# Patient Record
Sex: MALE | Race: WHITE | NOT HISPANIC OR LATINO | Employment: FULL TIME | URBAN - METROPOLITAN AREA
[De-identification: names, ages, dates, MRNs, and addresses within clinical notes are randomized per-mention and may not be internally consistent; named-entity substitution may affect disease eponyms.]

---

## 2017-01-03 ENCOUNTER — OFFICE VISIT - HEALTHEAST (OUTPATIENT)
Dept: INTERNAL MEDICINE | Facility: CLINIC | Age: 47
End: 2017-01-03

## 2017-01-03 DIAGNOSIS — Z00.00 HEALTH CARE MAINTENANCE: ICD-10-CM

## 2017-01-03 DIAGNOSIS — Z80.42 FAMILY HISTORY OF PROSTATE CANCER IN FATHER: ICD-10-CM

## 2017-01-03 DIAGNOSIS — E80.6 DISORDER OF BILIRUBIN EXCRETION: ICD-10-CM

## 2017-01-03 LAB
CHOLEST SERPL-MCNC: 182 MG/DL
FASTING STATUS PATIENT QL REPORTED: YES
HDLC SERPL-MCNC: 47 MG/DL
LDLC SERPL CALC-MCNC: 118 MG/DL
PSA SERPL-MCNC: 0.3 NG/ML (ref 0–2.5)
TRIGL SERPL-MCNC: 83 MG/DL

## 2017-01-03 ASSESSMENT — MIFFLIN-ST. JEOR: SCORE: 1731.68

## 2017-01-04 ENCOUNTER — COMMUNICATION - HEALTHEAST (OUTPATIENT)
Dept: INTERNAL MEDICINE | Facility: CLINIC | Age: 47
End: 2017-01-04

## 2018-04-06 ENCOUNTER — OFFICE VISIT - HEALTHEAST (OUTPATIENT)
Dept: INTERNAL MEDICINE | Facility: CLINIC | Age: 48
End: 2018-04-06

## 2018-04-06 DIAGNOSIS — Z12.11 SCREENING FOR COLORECTAL CANCER: ICD-10-CM

## 2018-04-06 DIAGNOSIS — Z80.42 FAMILY HISTORY OF PROSTATE CANCER IN FATHER: ICD-10-CM

## 2018-04-06 DIAGNOSIS — R00.2 PALPITATIONS: ICD-10-CM

## 2018-04-06 DIAGNOSIS — Z12.12 SCREENING FOR COLORECTAL CANCER: ICD-10-CM

## 2018-04-06 DIAGNOSIS — Z21 HIV ANTIBODY POSITIVE (H): ICD-10-CM

## 2018-04-06 DIAGNOSIS — Z00.00 HEALTH CARE MAINTENANCE: ICD-10-CM

## 2018-04-06 LAB
ALBUMIN SERPL-MCNC: 4.3 G/DL (ref 3.5–5)
ALBUMIN UR-MCNC: NEGATIVE MG/DL
ALP SERPL-CCNC: 84 U/L (ref 45–120)
ALT SERPL W P-5'-P-CCNC: 39 U/L (ref 0–45)
ANION GAP SERPL CALCULATED.3IONS-SCNC: 11 MMOL/L (ref 5–18)
APPEARANCE UR: CLEAR
AST SERPL W P-5'-P-CCNC: 37 U/L (ref 0–40)
BILIRUB SERPL-MCNC: 2 MG/DL (ref 0–1)
BILIRUB UR QL STRIP: NEGATIVE
BUN SERPL-MCNC: 26 MG/DL (ref 8–22)
CALCIUM SERPL-MCNC: 9.5 MG/DL (ref 8.5–10.5)
CHLORIDE BLD-SCNC: 108 MMOL/L (ref 98–107)
CHOLEST SERPL-MCNC: 168 MG/DL
CO2 SERPL-SCNC: 23 MMOL/L (ref 22–31)
COLOR UR AUTO: YELLOW
CREAT SERPL-MCNC: 1.16 MG/DL (ref 0.7–1.3)
ERYTHROCYTE [DISTWIDTH] IN BLOOD BY AUTOMATED COUNT: 12.3 % (ref 11–14.5)
FASTING STATUS PATIENT QL REPORTED: YES
GFR SERPL CREATININE-BSD FRML MDRD: >60 ML/MIN/1.73M2
GLUCOSE BLD-MCNC: 83 MG/DL (ref 70–125)
GLUCOSE UR STRIP-MCNC: NEGATIVE MG/DL
HCT VFR BLD AUTO: 48.1 % (ref 40–54)
HDLC SERPL-MCNC: 41 MG/DL
HGB BLD-MCNC: 16.5 G/DL (ref 14–18)
HGB UR QL STRIP: NEGATIVE
HIV 1+2 AB+HIV1 P24 AG SERPL QL IA: NEGATIVE
KETONES UR STRIP-MCNC: NEGATIVE MG/DL
LDLC SERPL CALC-MCNC: 108 MG/DL
LEUKOCYTE ESTERASE UR QL STRIP: NEGATIVE
MCH RBC QN AUTO: 30.5 PG (ref 27–34)
MCHC RBC AUTO-ENTMCNC: 34.3 G/DL (ref 32–36)
MCV RBC AUTO: 89 FL (ref 80–100)
NITRATE UR QL: NEGATIVE
PH UR STRIP: 7 [PH] (ref 5–8)
PLATELET # BLD AUTO: 177 THOU/UL (ref 140–440)
PMV BLD AUTO: 7.7 FL (ref 7–10)
POTASSIUM BLD-SCNC: 3.7 MMOL/L (ref 3.5–5)
PROT SERPL-MCNC: 7.3 G/DL (ref 6–8)
PSA SERPL-MCNC: 0.2 NG/ML (ref 0–3.5)
RBC # BLD AUTO: 5.41 MILL/UL (ref 4.4–6.2)
SODIUM SERPL-SCNC: 142 MMOL/L (ref 136–145)
SP GR UR STRIP: 1.02 (ref 1–1.03)
TRIGL SERPL-MCNC: 93 MG/DL
TSH SERPL DL<=0.005 MIU/L-ACNC: 0.96 UIU/ML (ref 0.3–5)
UROBILINOGEN UR STRIP-ACNC: NORMAL
WBC: 6.1 THOU/UL (ref 4–11)

## 2018-04-06 ASSESSMENT — MIFFLIN-ST. JEOR: SCORE: 1743.93

## 2018-04-09 LAB — 25(OH)D3 SERPL-MCNC: 18.6 NG/ML (ref 30–80)

## 2018-04-10 ENCOUNTER — AMBULATORY - HEALTHEAST (OUTPATIENT)
Dept: INTERNAL MEDICINE | Facility: CLINIC | Age: 48
End: 2018-04-10

## 2018-04-10 ENCOUNTER — HOSPITAL ENCOUNTER (OUTPATIENT)
Dept: CARDIOLOGY | Facility: CLINIC | Age: 48
Discharge: HOME | End: 2018-04-10
Attending: INTERNAL MEDICINE

## 2018-04-10 DIAGNOSIS — R17 ELEVATED BILIRUBIN: ICD-10-CM

## 2018-04-10 DIAGNOSIS — R00.2 PALPITATIONS: ICD-10-CM

## 2018-04-11 ENCOUNTER — COMMUNICATION - HEALTHEAST (OUTPATIENT)
Dept: INTERNAL MEDICINE | Facility: CLINIC | Age: 48
End: 2018-04-11

## 2018-05-14 ENCOUNTER — COMMUNICATION - HEALTHEAST (OUTPATIENT)
Dept: INTERNAL MEDICINE | Facility: CLINIC | Age: 48
End: 2018-05-14

## 2018-05-15 ENCOUNTER — COMMUNICATION - HEALTHEAST (OUTPATIENT)
Dept: FAMILY MEDICINE | Facility: CLINIC | Age: 48
End: 2018-05-15

## 2018-06-15 ENCOUNTER — RECORDS - HEALTHEAST (OUTPATIENT)
Dept: ADMINISTRATIVE | Facility: OTHER | Age: 48
End: 2018-06-15

## 2019-07-15 ENCOUNTER — OFFICE VISIT - HEALTHEAST (OUTPATIENT)
Dept: INTERNAL MEDICINE | Facility: CLINIC | Age: 49
End: 2019-07-15

## 2019-07-15 DIAGNOSIS — Z80.42 FAMILY HISTORY OF PROSTATE CANCER IN FATHER: ICD-10-CM

## 2019-07-15 DIAGNOSIS — E80.6 DISORDER OF BILIRUBIN EXCRETION: ICD-10-CM

## 2019-07-15 DIAGNOSIS — Z00.00 HEALTH CARE MAINTENANCE: ICD-10-CM

## 2019-07-15 DIAGNOSIS — C44.319 BASAL CELL CARCINOMA (BCC) OF SKIN OF OTHER PART OF FACE: ICD-10-CM

## 2019-07-15 DIAGNOSIS — Z00.00 ANNUAL PHYSICAL EXAM: ICD-10-CM

## 2019-07-15 LAB
ALBUMIN SERPL-MCNC: 4.3 G/DL (ref 3.5–5)
ALP SERPL-CCNC: 68 U/L (ref 45–120)
ALT SERPL W P-5'-P-CCNC: 37 U/L (ref 0–45)
ANION GAP SERPL CALCULATED.3IONS-SCNC: 12 MMOL/L (ref 5–18)
AST SERPL W P-5'-P-CCNC: 26 U/L (ref 0–40)
BILIRUB SERPL-MCNC: 1.7 MG/DL (ref 0–1)
BUN SERPL-MCNC: 30 MG/DL (ref 8–22)
CALCIUM SERPL-MCNC: 9.5 MG/DL (ref 8.5–10.5)
CHLORIDE BLD-SCNC: 107 MMOL/L (ref 98–107)
CHOLEST SERPL-MCNC: 165 MG/DL
CO2 SERPL-SCNC: 23 MMOL/L (ref 22–31)
CREAT SERPL-MCNC: 1.29 MG/DL (ref 0.7–1.3)
ERYTHROCYTE [DISTWIDTH] IN BLOOD BY AUTOMATED COUNT: 11.9 % (ref 11–14.5)
FASTING STATUS PATIENT QL REPORTED: YES
GFR SERPL CREATININE-BSD FRML MDRD: 59 ML/MIN/1.73M2
GLUCOSE BLD-MCNC: 87 MG/DL (ref 70–125)
HCT VFR BLD AUTO: 47.9 % (ref 40–54)
HDLC SERPL-MCNC: 44 MG/DL
HGB BLD-MCNC: 16.1 G/DL (ref 14–18)
LDLC SERPL CALC-MCNC: 102 MG/DL
MCH RBC QN AUTO: 30.1 PG (ref 27–34)
MCHC RBC AUTO-ENTMCNC: 33.7 G/DL (ref 32–36)
MCV RBC AUTO: 89 FL (ref 80–100)
PLATELET # BLD AUTO: 157 THOU/UL (ref 140–440)
PMV BLD AUTO: 7.2 FL (ref 7–10)
POTASSIUM BLD-SCNC: 3.7 MMOL/L (ref 3.5–5)
PROT SERPL-MCNC: 7.1 G/DL (ref 6–8)
PSA SERPL-MCNC: 0.2 NG/ML (ref 0–3.5)
RBC # BLD AUTO: 5.37 MILL/UL (ref 4.4–6.2)
SODIUM SERPL-SCNC: 142 MMOL/L (ref 136–145)
TRIGL SERPL-MCNC: 96 MG/DL
WBC: 6.2 THOU/UL (ref 4–11)

## 2019-07-15 ASSESSMENT — MIFFLIN-ST. JEOR: SCORE: 1728.05

## 2019-07-16 ENCOUNTER — COMMUNICATION - HEALTHEAST (OUTPATIENT)
Dept: INTERNAL MEDICINE | Facility: CLINIC | Age: 49
End: 2019-07-16

## 2019-07-16 LAB — 25(OH)D3 SERPL-MCNC: 27.5 NG/ML (ref 30–80)

## 2020-02-21 ENCOUNTER — RECORDS - HEALTHEAST (OUTPATIENT)
Dept: ADMINISTRATIVE | Facility: OTHER | Age: 50
End: 2020-02-21

## 2020-09-21 ENCOUNTER — RECORDS - HEALTHEAST (OUTPATIENT)
Dept: ADMINISTRATIVE | Facility: OTHER | Age: 50
End: 2020-09-21

## 2020-12-30 ENCOUNTER — COMMUNICATION - HEALTHEAST (OUTPATIENT)
Dept: TELEHEALTH | Facility: CLINIC | Age: 50
End: 2020-12-30

## 2020-12-30 ENCOUNTER — OFFICE VISIT - HEALTHEAST (OUTPATIENT)
Dept: FAMILY MEDICINE | Facility: CLINIC | Age: 50
End: 2020-12-30

## 2020-12-30 DIAGNOSIS — Z00.00 ANNUAL PHYSICAL EXAM: ICD-10-CM

## 2020-12-30 DIAGNOSIS — Z13.220 SCREENING FOR LIPOID DISORDERS: ICD-10-CM

## 2020-12-30 DIAGNOSIS — Z00.00 HEALTHCARE MAINTENANCE: ICD-10-CM

## 2020-12-30 DIAGNOSIS — E55.9 VITAMIN D DEFICIENCY: ICD-10-CM

## 2020-12-30 DIAGNOSIS — Z80.42 FAMILY HISTORY OF PROSTATE CANCER IN FATHER: ICD-10-CM

## 2020-12-30 DIAGNOSIS — E80.6 DISORDER OF BILIRUBIN EXCRETION: ICD-10-CM

## 2020-12-30 LAB
ALBUMIN SERPL-MCNC: 4.2 G/DL (ref 3.5–5)
ALP SERPL-CCNC: 73 U/L (ref 45–120)
ALT SERPL W P-5'-P-CCNC: 70 U/L (ref 0–45)
ANION GAP SERPL CALCULATED.3IONS-SCNC: 11 MMOL/L (ref 5–18)
AST SERPL W P-5'-P-CCNC: 40 U/L (ref 0–40)
BILIRUB SERPL-MCNC: 2.5 MG/DL (ref 0–1)
BUN SERPL-MCNC: 25 MG/DL (ref 8–22)
CALCIUM SERPL-MCNC: 8.9 MG/DL (ref 8.5–10.5)
CHLORIDE BLD-SCNC: 109 MMOL/L (ref 98–107)
CHOLEST SERPL-MCNC: 181 MG/DL
CO2 SERPL-SCNC: 22 MMOL/L (ref 22–31)
CREAT SERPL-MCNC: 1.29 MG/DL (ref 0.7–1.3)
ERYTHROCYTE [DISTWIDTH] IN BLOOD BY AUTOMATED COUNT: 11.7 % (ref 11–14.5)
FASTING STATUS PATIENT QL REPORTED: YES
GFR SERPL CREATININE-BSD FRML MDRD: 58 ML/MIN/1.73M2
GLUCOSE BLD-MCNC: 87 MG/DL (ref 70–125)
HCT VFR BLD AUTO: 51.2 % (ref 40–54)
HDLC SERPL-MCNC: 48 MG/DL
HGB BLD-MCNC: 17.1 G/DL (ref 14–18)
LDLC SERPL CALC-MCNC: 116 MG/DL
MCH RBC QN AUTO: 30.7 PG (ref 27–34)
MCHC RBC AUTO-ENTMCNC: 33.4 G/DL (ref 32–36)
MCV RBC AUTO: 92 FL (ref 80–100)
PLATELET # BLD AUTO: 176 THOU/UL (ref 140–440)
PMV BLD AUTO: 7.7 FL (ref 7–10)
POTASSIUM BLD-SCNC: 3.7 MMOL/L (ref 3.5–5)
PROT SERPL-MCNC: 7 G/DL (ref 6–8)
PSA SERPL-MCNC: 0.3 NG/ML (ref 0–3.5)
RBC # BLD AUTO: 5.56 MILL/UL (ref 4.4–6.2)
SODIUM SERPL-SCNC: 142 MMOL/L (ref 136–145)
TRIGL SERPL-MCNC: 85 MG/DL
WBC: 5.9 THOU/UL (ref 4–11)

## 2020-12-30 ASSESSMENT — MIFFLIN-ST. JEOR: SCORE: 1703.56

## 2020-12-31 ENCOUNTER — COMMUNICATION - HEALTHEAST (OUTPATIENT)
Dept: FAMILY MEDICINE | Facility: CLINIC | Age: 50
End: 2020-12-31

## 2020-12-31 LAB — 25(OH)D3 SERPL-MCNC: 22.2 NG/ML (ref 30–80)

## 2021-03-09 ENCOUNTER — RECORDS - HEALTHEAST (OUTPATIENT)
Dept: ADMINISTRATIVE | Facility: OTHER | Age: 51
End: 2021-03-09

## 2021-05-26 ENCOUNTER — RECORDS - HEALTHEAST (OUTPATIENT)
Dept: ADMINISTRATIVE | Facility: CLINIC | Age: 51
End: 2021-05-26

## 2021-05-30 VITALS — WEIGHT: 194.1 LBS | HEIGHT: 70 IN | BODY MASS INDEX: 27.79 KG/M2

## 2021-05-30 NOTE — PATIENT INSTRUCTIONS - HE
Recheck liver, kidneys, electrolytes today.    Recheck blood counts.    No immunizations today.    No changes to any medications.    Blood pressure and other vital signs look good today.    Recheck PSA.    Prostate felt normal today.    Dentist every 6 months.  Vision every 2 years.

## 2021-05-30 NOTE — PROGRESS NOTES
Assessment:   1. Annual physical exam  Generally healthy 49-year-old  male.    2. Health care maintenance  - [facility](CBC w/o Differential)  - Comprehensive Metabolic Panel  - Lipid Profile  - Vitamin D, Total (25-Hydroxy)  - PSA, Annual Screen (Prostatic-Specific Antigen)    3. Family history of prostate cancer in father  - PSA, Annual Screen (Prostatic-Specific Antigen)    4. Gilbert's Syndrome  Asymptomatic.  He is never had issues with jaundice    5. Basal cell carcinoma (BCC) of skin of other part of face  Sees the dermatologist once per year         Plan:     Patient Instructions   Recheck liver, kidneys, electrolytes today.    Recheck blood counts.    No immunizations today.    No changes to any medications.    Blood pressure and other vital signs look good today.    Recheck PSA.    Prostate felt normal today.    Dentist every 6 months.  Vision every 2 years.      Subjective:     Here for physical exam. Chart reviewed     Goes to the dentist every 6 months.  Vision is checked every 1 to 2 years.  Wears a seatbelt.  Happily  with 3 children.  He does have some baseline anxiety but says this is well can occasion.  He has no recent palpitations.  No problems with urination.      Past Medical History:   Diagnosis Date     Gilbert's syndrome      Past Surgical History:   Procedure Laterality Date     [place] APPENDECTOMY      Description: Appendectomy;  Recorded: 07/31/2008;     Patient has no known allergies.  Family History   Problem Relation Age of Onset     Prostate cancer Father      Hypertension Father      Hypertension Mother      Breast cancer Mother      Hyperlipidemia Mother      Social History     Socioeconomic History     Marital status:      Spouse name: Not on file     Number of children: Not on file     Years of education: Not on file     Highest education level: Not on file   Occupational History     Not on file   Social Needs     Financial resource strain: Not on file     Food  "insecurity:     Worry: Not on file     Inability: Not on file     Transportation needs:     Medical: Not on file     Non-medical: Not on file   Tobacco Use     Smoking status: Former Smoker   Substance and Sexual Activity     Alcohol use: Yes     Alcohol/week: 1.0 oz     Types: 2 Standard drinks or equivalent per week     Drug use: No     Sexual activity: Yes     Partners: Female     Birth control/protection: None   Lifestyle     Physical activity:     Days per week: Not on file     Minutes per session: Not on file     Stress: Not on file   Relationships     Social connections:     Talks on phone: Not on file     Gets together: Not on file     Attends Yazidi service: Not on file     Active member of club or organization: Not on file     Attends meetings of clubs or organizations: Not on file     Relationship status: Not on file     Intimate partner violence:     Fear of current or ex partner: Not on file     Emotionally abused: Not on file     Physically abused: Not on file     Forced sexual activity: Not on file   Other Topics Concern     Not on file   Social History Narrative     Not on file         Review of Systems  Please see form B           Objective:     Vitals:    07/15/19 0752   BP: 110/72   Pulse: 64   Weight: 193 lb 4.8 oz (87.7 kg)   Height: 5' 10\" (1.778 m)       Physical  General Appearance: Alert, cooperative, no distress, appears stated age  Head: Normocephalic, without obvious abnormality, atraumatic  Eyes: PERRL, fundi not observed, EOM's intact  Ears: Normal TM's and external ear canals bilateral  Nose: No abnormalities noted  Mouth and Throat: Normal appearance   Neck: Supple, symmetrical, trachea midline, no adenopathy or thyromegally,  no tenderness/mass/nodules; no carotid bruit or JVD  Back: no CVA tenderness or spinous process pain  Lungs: Clear to auscultation bilaterally, good air movent  Heart: Regular rate and rhythm, S1 and S2 normal, no murmur, rub, or gallop,  Abdomen: Soft, " non-tender, no masses, no organomegaly  Genitourinary:.  No hernias   Rectal exam:    Musculoskeletal: No gross abnormalities    Extremities: Extremities normal, atraumatic, no cyanosis or edema, pulses 2/4 and symmetric  Skin:  no  worrisome lesions noted  Lymph nodes: Cervical, supraclavicular, groin, and axillary nodes normal  Neurologic: CN2-12 intact, normal sensation, DTRs 2/4 and symmetric.   Psychiatric:  normal mood and affect.      No current outpatient medications on file.     No current facility-administered medications for this visit.

## 2021-06-01 VITALS — WEIGHT: 196.8 LBS | HEIGHT: 70 IN | BODY MASS INDEX: 28.18 KG/M2

## 2021-06-03 VITALS — BODY MASS INDEX: 27.67 KG/M2 | WEIGHT: 193.3 LBS | HEIGHT: 70 IN

## 2021-06-05 VITALS
OXYGEN SATURATION: 100 % | HEIGHT: 70 IN | HEART RATE: 68 BPM | DIASTOLIC BLOOD PRESSURE: 88 MMHG | BODY MASS INDEX: 26.9 KG/M2 | SYSTOLIC BLOOD PRESSURE: 134 MMHG | WEIGHT: 187.9 LBS

## 2021-06-08 NOTE — PROGRESS NOTES
Assessment:     Healthy male exam.   All preventive exams are up-to-date.  Fasting labs will be done today.        This note has been dictated using voice recognition software. Any grammatical or context distortions are unintentional and inherent to the software      1. Health care maintenance  HM2(CBC w/o Differential)    Comprehensive Metabolic Panel    Urinalysis-UC if Indicated    Vitamin D, Total (25-Hydroxy)    Lipid Profile   2. Family history of prostate cancer in father  PSA (Prostatic-Specific Antigen), Annual Screen   3. Gilbert's Syndrome  Bilirubin, Direct               Plan:     Return in about 1 year (around 1/3/2018) for Annual physical.    Subjective:     Chief Complaint   Patient presents with     Annual Exam     Teja Cummins is a 46 y.o. male who presents for an annual exam. No acute complaints.    Healthy Habits:   Regular Exercise: Yes  Sunscreen Use: Yes  Healthy Diet: Yes  Dental Visits Regularly: Yes  Seat Belt: Yes  Immunization status: up to date and documented.    Social History     Social History     Marital status:      Spouse name: N/A     Number of children: N/A     Years of education: N/A     Occupational History     Not on file.     Social History Main Topics     Smoking status: Former Smoker     Smokeless tobacco: Not on file     Alcohol use 1.0 oz/week     2 Standard drinks or equivalent per week     Drug use: No     Sexual activity: Yes     Partners: Female     Birth control/ protection: None     Other Topics Concern     Not on file     Social History Narrative       Family History   Problem Relation Age of Onset     Prostate cancer Father      Hypertension Father      Hypertension Mother      Breast cancer Mother      Hyperlipidemia Mother        Health Maintenance   Topic Date Due     INFLUENZA VACCINE RULE BASED (1) 08/01/2016     TD 18+ HE  07/31/2018     ADVANCE DIRECTIVES DISCUSSED WITH PATIENT  10/30/2020     TDAP ADULT ONE TIME DOSE  Completed       Patient  "Active Problem List   Diagnosis     Basal Cell Carcinoma Of The Skin     Gilbert's Syndrome     Family history of prostate cancer in father       No current outpatient prescriptions on file prior to visit.     No current facility-administered medications on file prior to visit.        Review of Systems  A 12 point comprehensive review of systems was negative except as noted in HPI.         Objective:      Visit Vitals     /70     Pulse (!) 58     Ht 5' 10\" (1.778 m)     Wt 194 lb 1.6 oz (88 kg)     BMI 27.85 kg/m2     Body mass index is 27.85 kg/(m^2).  Physical Exam:  General Appearance: Alert, cooperative, no distress, appears stated age.  Head: Normocephalic, without obvious abnormality, atraumatic  Eyes: PERRL, conjunctiva/corneas clear, EOM's intact  Ears: Normal TM's and external ear canals, both ears  Nose: Nares normal, septum midline,mucosa normal, no drainage  Throat: Lips, mucosa, and tongue normal; teeth and gums normal  Neck: Supple, symmetrical, trachea midline, no adenopathy;  thyroid: not enlarged, symmetric, no tenderness/mass/nodules; no carotid bruit or JVD  Back: Symmetric, no curvature, ROM normal, no CVA tenderness.  Lungs: Clear to auscultation bilaterally, respirations unlabored.  Breasts: No breast masses, tenderness, asymmetry, or nipple discharge.  Heart: Regular rate and rhythm, S1 and S2 normal, no murmur, rub, or gallop.  Abdomen: Soft, non-tender, bowel sounds active all four quadrants,  no masses, no organomegaly.  Pelvic:normal male genitalia, no palpable scrotal masses, prostate exam normal.  Extremities: Extremities normal, atraumatic, no cyanosis or edema.  Skin: Skin color, texture, turgor normal, no rashes or lesions.  Lymph nodes: Cervical, supraclavicular, and axillary nodes normal.  Neurologic: No focal neurological findings.  "

## 2021-06-14 NOTE — PATIENT INSTRUCTIONS - HE
Blood pressure and other vital signs look good today.    Weight looks fine.    Check with your insurance company about the Shingrix vaccine.    Follow-up in 1 year, sooner if needed.

## 2021-06-14 NOTE — PROGRESS NOTES
Assessment:   1. Annual physical exam  Generally healthy 50-year-old male    2. Healthcare maintenance  Due for colonoscopy in 2028  We talked about the Shingrix vaccine today.    3. Vitamin D deficiency  Vitamin D was a bit low last year.  He is not on replacement.  We can recheck again this year  - Vitamin D, Total (25-Hydroxy)    4. Gilbert's Syndrome  This is been stable.  He is asymptomatic.  No issues with jaundice.  His liver labs were normal last year  - Comprehensive Metabolic Panel  - HM2(CBC w/o Differential)    5. Family history of prostate cancer in father  No issues with nocturia.  His father had his prostate removed.  His prostate is physically assessed every year and has been normal.  - PSA (Prostatic-Specific Antigen), Annual Screen    6. Screening for lipoid disorders  He exercises on a regular basis.  - Lipid Profile         Plan:     Patient Instructions   Blood pressure and other vital signs look good today.    Weight looks fine.    Check with your insurance company about the Shingrix vaccine.    Follow-up in 1 year, sooner if needed.      Subjective:     Here for physical exam. Chart reviewed     Regular visits to the dentist every 6 months.  His anxiety has been well controlled without medication or therapy.  Happily  with 3 children.  He likes to lift weights and train for a 5K every year.  Has alcohol about once per week.  Has never smoked.      Past Medical History:   Diagnosis Date     Gilbert's syndrome      Past Surgical History:   Procedure Laterality Date     [place] APPENDECTOMY      Description: Appendectomy;  Recorded: 07/31/2008;     Patient has no known allergies.  Family History   Problem Relation Age of Onset     Prostate cancer Father      Hypertension Father      Hypertension Mother      Breast cancer Mother      Hyperlipidemia Mother      Social History     Socioeconomic History     Marital status:      Spouse name: Not on file     Number of children: Not on file      "Years of education: Not on file     Highest education level: Not on file   Occupational History     Not on file   Social Needs     Financial resource strain: Not on file     Food insecurity     Worry: Not on file     Inability: Not on file     Transportation needs     Medical: Not on file     Non-medical: Not on file   Tobacco Use     Smoking status: Former Smoker     Smokeless tobacco: Never Used   Substance and Sexual Activity     Alcohol use: Yes     Alcohol/week: 1.7 standard drinks     Types: 2 Standard drinks or equivalent per week     Drug use: No     Sexual activity: Yes     Partners: Female     Birth control/protection: None   Lifestyle     Physical activity     Days per week: Not on file     Minutes per session: Not on file     Stress: Not on file   Relationships     Social connections     Talks on phone: Not on file     Gets together: Not on file     Attends Zoroastrian service: Not on file     Active member of club or organization: Not on file     Attends meetings of clubs or organizations: Not on file     Relationship status: Not on file     Intimate partner violence     Fear of current or ex partner: Not on file     Emotionally abused: Not on file     Physically abused: Not on file     Forced sexual activity: Not on file   Other Topics Concern     Not on file   Social History Narrative     Not on file         Review of Systems  Please see form B           Objective:     Vitals:    12/30/20 0657   BP: 134/88   Pulse: 68   SpO2: 100%   Weight: 187 lb 14.4 oz (85.2 kg)   Height: 5' 10\" (1.778 m)       Physical  General Appearance: Alert, cooperative, no distress, appears stated age  Head: Normocephalic, without obvious abnormality, atraumatic  Eyes: PERRL, fundi not observed, EOM's intact  Ears: Normal TM's and external ear canals bilateral  Nose: No abnormalities noted  Mouth and Throat: Normal appearance   Neck: Supple, symmetrical, trachea midline, no adenopathy or thyromegally,  no " tenderness/mass/nodules; no carotid bruit or JVD  Back: no CVA tenderness or spinous process pain  Lungs: Clear to auscultation bilaterally, good air movent  Heart: Regular rate and rhythm, S1 and S2 normal, no murmur, rub, or gallop,  Abdomen: Soft, non-tender, no masses, no organomegaly  Genitourinary:.  No hernias   Rectal exam: Normal prostate  Musculoskeletal: No gross abnormalities    Extremities: Extremities normal, atraumatic, no cyanosis or edema, pulses 2/4 and symmetric  Skin:  no  worrisome lesions noted  Lymph nodes: Cervical, supraclavicular, groin, and axillary nodes normal  Neurologic: CN2-12 intact, normal sensation, DTRs 2/4 and symmetric.   Psychiatric:  normal mood and affect.      No current outpatient medications on file.     No current facility-administered medications for this visit.

## 2021-06-17 NOTE — PROGRESS NOTES
Assessment:     Healthy male exam.   All preventive exams are up-to-date.  Fasting labs will be done today.  HIV test will be done to confirm previous positive test..  Palpitations - TSH, Holter monitor ordered.    This note has been dictated using voice recognition software. Any grammatical or context distortions are unintentional and inherent to the software      1. Family history of prostate cancer in father  PSA, Annual Screen (Prostatic-Specific Antigen)   2. Health care maintenance  HM2(CBC w/o Differential)    Comprehensive Metabolic Panel    Lipid Profile    Vitamin D, Total (25-Hydroxy)    Urinalysis   3. Screening for colorectal cancer  Ambulatory referral for Colonoscopy   4. Palpitations  Thyroid Stimulating Hormone (TSH)    NEREIDA Hook-Up   5. HIV antibody positive  HIV Antigen/Antibody Screening [place]       There are no Patient Instructions on file for this visit.          Plan:     Return in about 1 year (around 4/6/2019) for Annual physical.    Subjective:     Chief Complaint   Patient presents with     Annual Exam     colon-not completed. fasting labs     Teja Cummins is a 48 y.o. male who presents for an annual exam.   He is experiencing palpitations on/off for the last few years when under the emotional stress, or drinking alcohol or is dehydrated. He describes heart racing with no other symptoms, episodes coming on and off for a whole day sometimes.    He wanted to donate blood last summer, but the test there came back positive for HIV. Patient declines any risk exposure, use of IV drugs or blood transfusion. He is  and has 2 children.    Healthy Habits:   Regular Exercise: Yes  Sunscreen Use: Yes  Healthy Diet: Yes  Dental Visits Regularly: Yes  Seat Belt: Yes  Immunization status: up to date and documented.    Social History     Social History     Marital status:      Spouse name: N/A     Number of children: N/A     Years of education: N/A     Occupational History     Not  "on file.     Social History Main Topics     Smoking status: Former Smoker     Smokeless tobacco: Not on file     Alcohol use 1.0 oz/week     2 Standard drinks or equivalent per week     Drug use: No     Sexual activity: Yes     Partners: Female     Birth control/ protection: None     Other Topics Concern     Not on file     Social History Narrative       Family History   Problem Relation Age of Onset     Prostate cancer Father      Hypertension Father      Hypertension Mother      Breast cancer Mother      Hyperlipidemia Mother        Health Maintenance   Topic Date Due     INFLUENZA VACCINE RULE BASED (1) 08/01/2017     COLONOSCOPY  02/05/2018     TD 18+ HE  07/31/2018     ADVANCE DIRECTIVES DISCUSSED WITH PATIENT  10/30/2020     TDAP ADULT ONE TIME DOSE  Completed       Patient Active Problem List   Diagnosis     Basal Cell Carcinoma Of The Skin     Gilbert's Syndrome     Family history of prostate cancer in father       No current outpatient prescriptions on file prior to visit.     No current facility-administered medications on file prior to visit.        Review of Systems  A 12 point comprehensive review of systems was negative except as noted in HPI.         Objective:      /80  Pulse 64  Ht 5' 10\" (1.778 m)  Wt 196 lb 12.8 oz (89.3 kg)  BMI 28.24 kg/m2  Body mass index is 28.24 kg/(m^2).  Physical Exam:  General Appearance: Alert, cooperative, no distress, appears stated age.  Head: Normocephalic, without obvious abnormality, atraumatic  Eyes: PERRL, conjunctiva/corneas clear, EOM's intact  Ears: Normal TM's and external ear canals, both ears  Nose: Nares normal, septum midline,mucosa normal, no drainage  Throat: Lips, mucosa, and tongue normal; teeth and gums normal  Neck: Supple, symmetrical, trachea midline, no adenopathy;  thyroid: not enlarged, symmetric, no tenderness/mass/nodules; no carotid bruit or JVD  Back: Symmetric, no curvature, ROM normal, no CVA tenderness.  Lungs: Clear to " auscultation bilaterally, respirations unlabored.  Breasts: No breast masses, tenderness, asymmetry, or nipple discharge.  Heart: Regular rate and rhythm, S1 and S2 normal, no murmur, rub, or gallop.  Abdomen: Soft, non-tender, bowel sounds active all four quadrants,  no masses, no organomegaly.  Pelvic:normal male genitalia, no palpable scrotal masses, prostate exam normal.  Extremities: Extremities normal, atraumatic, no cyanosis or edema.  Skin: Skin color, texture, turgor normal, no rashes or lesions.  Lymph nodes: Cervical, supraclavicular, and axillary nodes normal.  Neurologic: No focal neurological findings.

## 2021-06-19 NOTE — LETTER
Letter by Jase Rae CNP at      Author: Jase Rae CNP Service: -- Author Type: --    Filed:  Encounter Date: 7/16/2019 Status: (Other)         Teja Cummins  [address] [facility] [address]             July 16, 2019         Dear Mr. Cummins,    Below are the results from your recent visit:    Resulted Orders   HM2(CBC w/o Differential)   Result Value Ref Range    WBC 6.2 4.0 - 11.0 thou/uL    RBC 5.37 4.40 - 6.20 mill/uL    Hemoglobin 16.1 14.0 - 18.0 g/dL    Hematocrit 47.9 40.0 - 54.0 %    MCV 89 80 - 100 fL    MCH 30.1 27.0 - 34.0 pg    MCHC 33.7 32.0 - 36.0 g/dL    RDW 11.9 11.0 - 14.5 %    Platelets 157 140 - 440 thou/uL    MPV 7.2 7.0 - 10.0 fL   Comprehensive Metabolic Panel   Result Value Ref Range    Sodium 142 136 - 145 mmol/L    Potassium 3.7 3.5 - 5.0 mmol/L    Chloride 107 98 - 107 mmol/L    CO2 23 22 - 31 mmol/L    Anion Gap, Calculation 12 5 - 18 mmol/L    Glucose 87 70 - 125 mg/dL    BUN 30 (H) 8 - 22 mg/dL    Creatinine 1.29 0.70 - 1.30 mg/dL    GFR MDRD Af Amer >60 >60 mL/min/1.73m2    GFR MDRD Non Af Amer 59 (L) >60 mL/min/1.73m2    Bilirubin, Total 1.7 (H) 0.0 - 1.0 mg/dL    Calcium 9.5 8.5 - 10.5 mg/dL    Protein, Total 7.1 6.0 - 8.0 g/dL    Albumin 4.3 3.5 - 5.0 g/dL    Alkaline Phosphatase 68 45 - 120 U/L    AST 26 0 - 40 U/L    ALT 37 0 - 45 U/L    Narrative    Fasting Glucose reference range is 70-99 mg/dL per  American Diabetes Association (ADA) guidelines.   Lipid Profile   Result Value Ref Range    Triglycerides 96 <=149 mg/dL    Cholesterol 165 <=199 mg/dL    LDL Calculated 102 <=129 mg/dL    HDL Cholesterol 44 >=40 mg/dL    Patient Fasting > 8hrs? Yes    Vitamin D, Total (25-Hydroxy)   Result Value Ref Range    Vitamin D, Total (25-Hydroxy) 27.5 (L) 30.0 - 80.0 ng/mL    Narrative    Deficiency <10.0 ng/mL  Insufficiency 10.0-29.9 ng/mL  Sufficiency 30.0-80.0 ng/mL  Toxicity (possible) >100.0 ng/mL   PSA, Annual Screen (Prostatic-Specific Antigen)   Result  Value Ref Range    PSA 0.2 0.0 - 3.5 ng/mL    Narrative    Method is Abbott Prostate-Specific Antigen (PSA)  Standard-WHO 1st International (90:10)       Labs look stable.    Vitamin D is borderline low.  I recommend that you take your vitamin D supplement every day, including in the summer.    Follow-up next year, sooner if needed.    Please call with questions or contact us using [employer].    Sincerely,        Electronically signed by Jase Rae CNP

## 2021-06-21 NOTE — LETTER
Letter by Jase Rae CNP at      Author: Jase Rae CNP Service: -- Author Type: --    Filed:  Encounter Date: 12/31/2020 Status: (Other)         Teja Cummins  [address] [facility] [address]           December 31, 2020         Dear Mr. Cummins,    Below are the results from your recent visit:    Resulted Orders   PSA (Prostatic-Specific Antigen), Annual Screen   Result Value Ref Range    PSA 0.3 0.0 - 3.5 ng/mL    Narrative    Method is Abbott Prostate-Specific Antigen (PSA)  Standard-WHO 1st International (90:10)   Lipid Profile   Result Value Ref Range    Triglycerides 85 <=149 mg/dL    Cholesterol 181 <=199 mg/dL    LDL Calculated 116 <=129 mg/dL    HDL Cholesterol 48 >=40 mg/dL    Patient Fasting > 8hrs? Yes    Comprehensive Metabolic Panel   Result Value Ref Range    Sodium 142 136 - 145 mmol/L    Potassium 3.7 3.5 - 5.0 mmol/L    Chloride 109 (H) 98 - 107 mmol/L    CO2 22 22 - 31 mmol/L    Anion Gap, Calculation 11 5 - 18 mmol/L    Glucose 87 70 - 125 mg/dL    BUN 25 (H) 8 - 22 mg/dL    Creatinine 1.29 0.70 - 1.30 mg/dL    GFR MDRD Af Amer >60 >60 mL/min/1.73m2    GFR MDRD Non Af Amer 58 (L) >60 mL/min/1.73m2    Bilirubin, Total 2.5 (H) 0.0 - 1.0 mg/dL    Calcium 8.9 8.5 - 10.5 mg/dL    Protein, Total 7.0 6.0 - 8.0 g/dL    Albumin 4.2 3.5 - 5.0 g/dL    Alkaline Phosphatase 73 45 - 120 U/L    AST 40 0 - 40 U/L    ALT 70 (H) 0 - 45 U/L    Narrative    Fasting Glucose reference range is 70-99 mg/dL per  American Diabetes Association (ADA) guidelines.   HM2(CBC w/o Differential)   Result Value Ref Range    WBC 5.9 4.0 - 11.0 thou/uL    RBC 5.56 4.40 - 6.20 mill/uL    Hemoglobin 17.1 14.0 - 18.0 g/dL    Hematocrit 51.2 40.0 - 54.0 %    MCV 92 80 - 100 fL    MCH 30.7 27.0 - 34.0 pg    MCHC 33.4 32.0 - 36.0 g/dL    RDW 11.7 11.0 - 14.5 %    Platelets 176 140 - 440 thou/uL    MPV 7.7 7.0 - 10.0 fL   Vitamin D, Total (25-Hydroxy)   Result Value Ref Range    Vitamin D, Total (25-Hydroxy) 22.2  (L) 30.0 - 80.0 ng/mL    Narrative    Deficiency <10.0 ng/mL  Insufficiency 10.0-29.9 ng/mL  Sufficiency 30.0-80.0 ng/mL  Toxicity (possible) >100.0 ng/mL        ALT is very mildly elevated this year; likely related to your underlying Gilbert's syndrome.  We may want to recheck this in a few months to ensure that it is stable.    Vitamin D is still low.  I recommend 3 to 5000 units daily.  This is available over-the-counter.    Cholesterol labs again look good this year.    Blood counts are normal.    PSA is normal.    Please call with questions or contact us using [employer].    Sincerely,        Electronically signed by Jase Rae CNP

## 2021-06-27 ENCOUNTER — HEALTH MAINTENANCE LETTER (OUTPATIENT)
Age: 51
End: 2021-06-27

## 2021-08-03 ENCOUNTER — OFFICE VISIT (OUTPATIENT)
Dept: FAMILY MEDICINE | Facility: CLINIC | Age: 51
End: 2021-08-03
Payer: COMMERCIAL

## 2021-08-03 VITALS
BODY MASS INDEX: 26.58 KG/M2 | DIASTOLIC BLOOD PRESSURE: 84 MMHG | HEIGHT: 70 IN | WEIGHT: 185.7 LBS | HEART RATE: 68 BPM | SYSTOLIC BLOOD PRESSURE: 128 MMHG | OXYGEN SATURATION: 98 %

## 2021-08-03 DIAGNOSIS — L72.3 SEBACEOUS CYST: Primary | ICD-10-CM

## 2021-08-03 PROCEDURE — 99213 OFFICE O/P EST LOW 20 MIN: CPT | Performed by: NURSE PRACTITIONER

## 2021-08-03 ASSESSMENT — MIFFLIN-ST. JEOR: SCORE: 1693.58

## 2021-08-03 NOTE — PROGRESS NOTES
"    Assessment & Plan     Sebaceous cyst  Talked about conservative measures. See patient instructions below for plan of care    Patient Instructions   I suspect that the small lesion on your calf is a sebaceous cyst.    I do not think it is a lipoma. I do not think that it is a tear in your fascia, resulting in a small muscular hernia.    I do not think you have a blood clot in your calf.    I do not think we need to incise and drain the cyst today; it does not appear to need antibiotics at this point.    Lets do aggressive warm compresses four times daily for the next week. Use a hot wet rag.    If your symptoms do not start to get better within the next week, send us a [employer] message and we can try oral antibiotics.      17 minutes spent on the date of the encounter doing chart review, history and exam, documentation and further activities per the note       BMI:   Estimated body mass index is 26.65 kg/m  as calculated from the following:    Height as of this encounter: 1.778 m (5' 10\").    Weight as of this encounter: 84.2 kg (185 lb 11.2 oz).       See Patient Instructions    Return in about 4 months (around 12/3/2021) for Routine preventive.    Jase Rae, [facility]    Ester Lezama is a 51 year old who presents for the following health issues   HPI   Patient comes to the clinic today with some tenderness associated to a small lesion on his right calf muscle.    He first started having some lower right calf muscle pain about 2 to 3 weeks ago. He was running and noticed some tenderness in that area. Seemed to have discomfort when overusing his right leg.    The tenderness in the calf muscle has subsided mostly, but he continues to have some soreness near a very specific spot in his calf, where he has noticed a small \"lump.\"    He does not recall getting stung by an insect or having any localized trauma to that area.    He has not noticed any redness. No " "streaking. No fevers.      Review of Systems   Constitutional, HEENT, cardiovascular, pulmonary, gi and gu systems are negative, except as otherwise noted.      Objective    /84 (BP Location: Right arm, Patient Position: Sitting, Cuff Size: Adult Regular)   Pulse 68   Ht 1.778 m (5' 10\")   Wt 84.2 kg (185 lb 11.2 oz)   SpO2 98%   BMI 26.65 kg/m    Body mass index is 26.65 kg/m .  Physical Exam   His right calf is 44.5 cm in circumference. His left calf is 43.5 centimeters in circumference.    Homans' sign negative bilaterally.    There appears to be a \"lump\" in the patient's mid left calf, medially. It is lobulated, tender, nonmobile, firm, approximately 2.5 cm in diameter. Unclear if there is a central \"head.\"        "

## 2021-08-03 NOTE — PATIENT INSTRUCTIONS
I suspect that the small lesion on your calf is a sebaceous cyst.    I do not think it is a lipoma. I do not think that it is a tear in your fascia, resulting in a small muscular hernia.    I do not think you have a blood clot in your calf.    I do not think we need to incise and drain the cyst today; it does not appear to need antibiotics at this point.    Lets do aggressive warm compresses four times daily for the next week. Use a hot wet rag.    If your symptoms do not start to get better within the next week, send us a MyChart message and we can try oral antibiotics.

## 2021-09-15 ENCOUNTER — TRANSFERRED RECORDS (OUTPATIENT)
Dept: HEALTH INFORMATION MANAGEMENT | Facility: CLINIC | Age: 51
End: 2021-09-15

## 2021-10-17 ENCOUNTER — HEALTH MAINTENANCE LETTER (OUTPATIENT)
Age: 51
End: 2021-10-17

## 2022-02-06 ENCOUNTER — HEALTH MAINTENANCE LETTER (OUTPATIENT)
Age: 52
End: 2022-02-06

## 2022-03-10 ENCOUNTER — OFFICE VISIT (OUTPATIENT)
Dept: FAMILY MEDICINE | Facility: CLINIC | Age: 52
End: 2022-03-10
Payer: COMMERCIAL

## 2022-03-10 VITALS
BODY MASS INDEX: 27.3 KG/M2 | HEIGHT: 71 IN | WEIGHT: 195 LBS | HEART RATE: 57 BPM | OXYGEN SATURATION: 100 % | SYSTOLIC BLOOD PRESSURE: 112 MMHG | DIASTOLIC BLOOD PRESSURE: 84 MMHG

## 2022-03-10 DIAGNOSIS — Z00.00 HEALTHCARE MAINTENANCE: ICD-10-CM

## 2022-03-10 DIAGNOSIS — Z13.220 SCREENING FOR LIPOID DISORDERS: ICD-10-CM

## 2022-03-10 DIAGNOSIS — E80.6 DISORDER OF BILIRUBIN EXCRETION: ICD-10-CM

## 2022-03-10 DIAGNOSIS — E55.9 VITAMIN D DEFICIENCY: ICD-10-CM

## 2022-03-10 DIAGNOSIS — Z12.5 SPECIAL SCREENING FOR MALIGNANT NEOPLASM OF PROSTATE: Primary | ICD-10-CM

## 2022-03-10 DIAGNOSIS — Z00.00 ANNUAL PHYSICAL EXAM: ICD-10-CM

## 2022-03-10 LAB
ALBUMIN SERPL-MCNC: 4.2 G/DL (ref 3.5–5)
ALP SERPL-CCNC: 70 U/L (ref 45–120)
ALT SERPL W P-5'-P-CCNC: 44 U/L (ref 0–45)
ANION GAP SERPL CALCULATED.3IONS-SCNC: 12 MMOL/L (ref 5–18)
AST SERPL W P-5'-P-CCNC: 29 U/L (ref 0–40)
BILIRUB SERPL-MCNC: 2 MG/DL (ref 0–1)
BUN SERPL-MCNC: 31 MG/DL (ref 8–22)
CALCIUM SERPL-MCNC: 9.3 MG/DL (ref 8.5–10.5)
CHLORIDE BLD-SCNC: 109 MMOL/L (ref 98–107)
CHOLEST SERPL-MCNC: 149 MG/DL
CO2 SERPL-SCNC: 22 MMOL/L (ref 22–31)
CREAT SERPL-MCNC: 1.06 MG/DL (ref 0.7–1.3)
FASTING STATUS PATIENT QL REPORTED: YES
GFR SERPL CREATININE-BSD FRML MDRD: 83 ML/MIN/1.73M2
GLUCOSE BLD-MCNC: 83 MG/DL (ref 70–125)
HDLC SERPL-MCNC: 40 MG/DL
LDLC SERPL CALC-MCNC: 95 MG/DL
POTASSIUM BLD-SCNC: 3.8 MMOL/L (ref 3.5–5)
PROT SERPL-MCNC: 7 G/DL (ref 6–8)
PSA SERPL-MCNC: 0.17 UG/L (ref 0–3.5)
SODIUM SERPL-SCNC: 143 MMOL/L (ref 136–145)
TRIGL SERPL-MCNC: 70 MG/DL

## 2022-03-10 PROCEDURE — G0103 PSA SCREENING: HCPCS | Performed by: NURSE PRACTITIONER

## 2022-03-10 PROCEDURE — 80061 LIPID PANEL: CPT | Performed by: NURSE PRACTITIONER

## 2022-03-10 PROCEDURE — 82306 VITAMIN D 25 HYDROXY: CPT | Performed by: NURSE PRACTITIONER

## 2022-03-10 PROCEDURE — 36415 COLL VENOUS BLD VENIPUNCTURE: CPT | Performed by: NURSE PRACTITIONER

## 2022-03-10 PROCEDURE — 80053 COMPREHEN METABOLIC PANEL: CPT | Performed by: NURSE PRACTITIONER

## 2022-03-10 PROCEDURE — 99396 PREV VISIT EST AGE 40-64: CPT | Performed by: NURSE PRACTITIONER

## 2022-03-10 NOTE — PROGRESS NOTES
SUBJECTIVE:   CC: Teja Cummins is an 52 year old male who presents for preventative health visit.       Patient has been advised of split billing requirements and indicates understanding: No  Healthy Habits:     Getting at least 3 servings of Calcium per day:  Yes    Bi-annual eye exam:  Yes    Dental care twice a year:  NO    Sleep apnea or symptoms of sleep apnea:  None    Diet:  Regular (no restrictions)    Frequency of exercise:  4-5 days/week    Duration of exercise:  15-30 minutes    Taking medications regularly:  Yes    Medication side effects:  None    PHQ-2 Total Score: 0    Additional concerns today:  No    Today's PHQ-2 Score:   PHQ-2 ( 1999 Pfizer) 3/9/2022   Q1: Little interest or pleasure in doing things 0   Q2: Feeling down, depressed or hopeless 0   PHQ-2 Score 0   Q1: Little interest or pleasure in doing things Not at all   Q2: Feeling down, depressed or hopeless Not at all   PHQ-2 Score 0       Abuse: Current or Past(Physical, Sexual or Emotional)- No  Do you feel safe in your environment? Yes    Have you ever done Advance Care Planning? (For example, a Health Directive, POLST, or a discussion with a medical provider or your loved ones about your wishes): No, advance care planning information given to patient to review.  Patient declined advance care planning discussion at this time.    Social History     Tobacco Use     Smoking status: Former Smoker     Smokeless tobacco: Never Used   Substance Use Topics     Alcohol use: Yes     Alcohol/week: 1.7 standard drinks     If you drink alcohol do you typically have >3 drinks per day or >7 drinks per week? No    Alcohol Use 3/9/2022   Prescreen: >3 drinks/day or >7 drinks/week? No   No flowsheet data found.    Last PSA:   Prostate Specific Antigen Screen   Date Value Ref Range Status   12/30/2020 0.3 0.0 - 3.5 ng/mL Final       Reviewed orders with patient. Reviewed health maintenance and updated orders accordingly - Yes  Lab work is in  "process    Reviewed and updated as needed this visit by clinical staff                  Reviewed and updated as needed this visit by Provider                 Past Medical History:   Diagnosis Date     Gilbert's syndrome         Review of Systems  CONSTITUTIONAL: NEGATIVE for fever, chills, change in weight  INTEGUMENTARY/SKIN: NEGATIVE for worrisome rashes, moles or lesions  EYES: NEGATIVE for vision changes or irritation  ENT: NEGATIVE for ear, mouth and throat problems  RESP: NEGATIVE for significant cough or SOB  CV: NEGATIVE for chest pain, palpitations or peripheral edema  GI: NEGATIVE for nausea, abdominal pain, heartburn, or change in bowel habits   male: negative for dysuria, hematuria, decreased urinary stream, erectile dysfunction, urethral discharge  MUSCULOSKELETAL: NEGATIVE for significant arthralgias or myalgia  NEURO: NEGATIVE for weakness, dizziness or paresthesias  PSYCHIATRIC: NEGATIVE for changes in mood or affect    OBJECTIVE:   /84 (BP Location: Right arm, Patient Position: Sitting, Cuff Size: Adult Regular)   Pulse 57   Ht 1.791 m (5' 10.5\")   Wt 88.5 kg (195 lb)   SpO2 100%   BMI 27.58 kg/m      Physical Exam  GENERAL: healthy, alert and no distress  NECK: no adenopathy, no asymmetry, masses, or scars and thyroid normal to palpation  RESP: lungs clear to auscultation - no rales, rhonchi or wheezes  CV: regular rate and rhythm, normal S1 S2, no S3 or S4, no murmur, click or rub, no peripheral edema and peripheral pulses strong  ABDOMEN: soft, nontender, no hepatosplenomegaly, no masses and bowel sounds normal  MS: no gross musculoskeletal defects noted, no edema    Diagnostic Test Results:  Labs reviewed in Epic    ASSESSMENT/PLAN:     1. Annual physical exam  Generally healthy 52-year-old male.  His anxiety is well controlled without therapy or medication.    2. Healthcare maintenance  Colonoscopy due in 2028.  Check a PSA today.  He is up-to-date on immunizations but we did talk " "about Shingrix vaccine today    3. Special screening for malignant neoplasm of prostate  Family history of prostate cancer in his father  - PSA, screen; Future    4. Vitamin D deficiency  Has historically been low.  Now intermittent use of about 1000 IU daily  - Vitamin D deficiency screening; Future    5. Gilbert's Syndrome  Liver labs have historically been stable  - Comprehensive metabolic panel; Future    6. Screening for lipoid disorders  Cholesterol labs have been stable  - Lipid Profile (Chol, Trig, HDL, LDL calc); Future    Patient Instructions   Blood pressure and other vital signs are good today.    Weight is up about 10 pounds since August.    We will check a variety of labs today.  Results will be made available on [employer].    Check with insurance about Shingrix coverage.  You can always get this at [facility] and [facility] for reduced cost.    Follow-up in 1 year, sooner if needed        Patient has been advised of split billing requirements and indicates understanding: No    COUNSELING:   Reviewed preventive health counseling, as reflected in patient instructions       Regular exercise       Healthy diet/nutrition       Vision screening    Estimated body mass index is 26.65 kg/m  as calculated from the following:    Height as of 8/3/21: 1.778 m (5' 10\").    Weight as of 8/3/21: 84.2 kg (185 lb 11.2 oz).     Weight management plan: Discussed healthy diet and exercise guidelines    He reports that he has quit smoking. He has never used smokeless tobacco.      Counseling Resources:  ATP IV Guidelines  Pooled Cohorts Equation Calculator  FRAX Risk Assessment  ICSI Preventive Guidelines  Dietary Guidelines for Americans, 2010  USDA's MyPlate  ASA Prophylaxis  Lung CA Screening    Jase Rae, CNP  [facility]  "

## 2022-03-10 NOTE — PATIENT INSTRUCTIONS
Blood pressure and other vital signs are good today.    Weight is up about 10 pounds since August.    We will check a variety of labs today.  Results will be made available on [employer].    Check with insurance about Shingrix coverage.  You can always get this at [facility] and [facility] for reduced cost.    Follow-up in 1 year, sooner if needed

## 2022-03-11 LAB — DEPRECATED CALCIDIOL+CALCIFEROL SERPL-MC: 23 UG/L (ref 30–80)

## 2022-09-13 ENCOUNTER — TRANSFERRED RECORDS (OUTPATIENT)
Dept: HEALTH INFORMATION MANAGEMENT | Facility: CLINIC | Age: 52
End: 2022-09-13

## 2022-10-02 ENCOUNTER — HEALTH MAINTENANCE LETTER (OUTPATIENT)
Age: 52
End: 2022-10-02

## 2022-11-16 ENCOUNTER — E-VISIT (OUTPATIENT)
Dept: INTERNAL MEDICINE | Facility: CLINIC | Age: 52
End: 2022-11-16
Payer: COMMERCIAL

## 2022-11-16 DIAGNOSIS — N34.2 URETHRITIS: ICD-10-CM

## 2022-11-16 DIAGNOSIS — R30.0 DYSURIA: Primary | ICD-10-CM

## 2022-11-16 PROCEDURE — 99421 OL DIG E/M SVC 5-10 MIN: CPT | Performed by: NURSE PRACTITIONER

## 2022-11-16 NOTE — PATIENT INSTRUCTIONS
Dear Teja Cummins,     After reviewing your responses, I would like you to come in for a urine test to make sure we treat you correctly. This urine test is to evaluate you for a possible urinary tract infection, and should be scheduled for today or tomorrow. Schedule a Lab Only appointment here.     Lab appointments are not available at most locations on the weekends. If no Lab Only appointment is available, you should be seen in any of our convenient Walk-in or Urgent Care Centers, which can be found on our website here.     You will receive instructions with your results in [employer] once they are available.     If your symptoms worsen, you develop pain in your back or stomach, develop fevers, or are not improving in 5 days, please contact your primary care provider for an appointment or visit a Walk-in or Urgent Care Center to be seen.     Thanks again for choosing us as your health care partner,     Jase Rae, CNP

## 2022-11-18 ENCOUNTER — LAB (OUTPATIENT)
Dept: LAB | Facility: CLINIC | Age: 52
End: 2022-11-18
Payer: COMMERCIAL

## 2022-11-18 DIAGNOSIS — R30.0 DYSURIA: ICD-10-CM

## 2022-11-18 LAB
ALBUMIN UR-MCNC: NEGATIVE MG/DL
APPEARANCE UR: CLEAR
BILIRUB UR QL STRIP: NEGATIVE
COLOR UR AUTO: YELLOW
GLUCOSE UR STRIP-MCNC: NEGATIVE MG/DL
HGB UR QL STRIP: NEGATIVE
KETONES UR STRIP-MCNC: NEGATIVE MG/DL
LEUKOCYTE ESTERASE UR QL STRIP: NEGATIVE
NITRATE UR QL: NEGATIVE
PH UR STRIP: 6.5 [PH] (ref 5–8)
SP GR UR STRIP: 1.02 (ref 1–1.03)
UROBILINOGEN UR STRIP-ACNC: 0.2 E.U./DL

## 2022-11-18 PROCEDURE — 81003 URINALYSIS AUTO W/O SCOPE: CPT

## 2023-03-23 ASSESSMENT — ENCOUNTER SYMPTOMS
EYE PAIN: 0
PARESTHESIAS: 0
ABDOMINAL PAIN: 0
CONSTIPATION: 0
MYALGIAS: 0
HEMATURIA: 0
HEADACHES: 0
SHORTNESS OF BREATH: 0
COUGH: 0
FREQUENCY: 0
NERVOUS/ANXIOUS: 0
NAUSEA: 0
JOINT SWELLING: 0
DIARRHEA: 0
DIZZINESS: 0
SORE THROAT: 0
CHILLS: 0
HEMATOCHEZIA: 0
FEVER: 0
DYSURIA: 0
PALPITATIONS: 0
HEARTBURN: 0
WEAKNESS: 0
ARTHRALGIAS: 0

## 2023-03-30 ENCOUNTER — OFFICE VISIT (OUTPATIENT)
Dept: INTERNAL MEDICINE | Facility: CLINIC | Age: 53
End: 2023-03-30
Payer: COMMERCIAL

## 2023-03-30 VITALS
HEIGHT: 71 IN | TEMPERATURE: 98 F | BODY MASS INDEX: 27.58 KG/M2 | SYSTOLIC BLOOD PRESSURE: 132 MMHG | WEIGHT: 197 LBS | RESPIRATION RATE: 16 BRPM | HEART RATE: 63 BPM | DIASTOLIC BLOOD PRESSURE: 88 MMHG | OXYGEN SATURATION: 99 %

## 2023-03-30 DIAGNOSIS — C44.91 BASAL CELL CARCINOMA (BCC), UNSPECIFIED SITE: ICD-10-CM

## 2023-03-30 DIAGNOSIS — E80.6 DISORDER OF BILIRUBIN EXCRETION: Primary | ICD-10-CM

## 2023-03-30 DIAGNOSIS — Z13.220 SCREENING FOR LIPOID DISORDERS: ICD-10-CM

## 2023-03-30 DIAGNOSIS — Z00.00 ANNUAL PHYSICAL EXAM: ICD-10-CM

## 2023-03-30 DIAGNOSIS — D03.71 MELANOMA IN SITU OF RIGHT LOWER LEG (H): ICD-10-CM

## 2023-03-30 DIAGNOSIS — Z80.42 FAMILY HISTORY OF PROSTATE CANCER IN FATHER: ICD-10-CM

## 2023-03-30 LAB
ALBUMIN SERPL BCG-MCNC: 4.3 G/DL (ref 3.5–5.2)
ALP SERPL-CCNC: 73 U/L (ref 40–129)
ALT SERPL W P-5'-P-CCNC: 72 U/L (ref 10–50)
ANION GAP SERPL CALCULATED.3IONS-SCNC: 13 MMOL/L (ref 7–15)
AST SERPL W P-5'-P-CCNC: 46 U/L (ref 10–50)
BILIRUB SERPL-MCNC: 1.3 MG/DL
BUN SERPL-MCNC: 26.9 MG/DL (ref 6–20)
CALCIUM SERPL-MCNC: 9.2 MG/DL (ref 8.6–10)
CHLORIDE SERPL-SCNC: 106 MMOL/L (ref 98–107)
CHOLEST SERPL-MCNC: 155 MG/DL
CREAT SERPL-MCNC: 1.23 MG/DL (ref 0.67–1.17)
DEPRECATED HCO3 PLAS-SCNC: 23 MMOL/L (ref 22–29)
GFR SERPL CREATININE-BSD FRML MDRD: 69 ML/MIN/1.73M2
GLUCOSE SERPL-MCNC: 91 MG/DL (ref 70–99)
HDLC SERPL-MCNC: 38 MG/DL
LDLC SERPL CALC-MCNC: 103 MG/DL
NONHDLC SERPL-MCNC: 117 MG/DL
POTASSIUM SERPL-SCNC: 4 MMOL/L (ref 3.4–5.3)
PROT SERPL-MCNC: 7 G/DL (ref 6.4–8.3)
PSA SERPL DL<=0.01 NG/ML-MCNC: 0.2 NG/ML (ref 0–3.5)
SODIUM SERPL-SCNC: 142 MMOL/L (ref 136–145)
TRIGL SERPL-MCNC: 72 MG/DL

## 2023-03-30 PROCEDURE — 99213 OFFICE O/P EST LOW 20 MIN: CPT | Mod: 25 | Performed by: NURSE PRACTITIONER

## 2023-03-30 PROCEDURE — 80053 COMPREHEN METABOLIC PANEL: CPT | Performed by: NURSE PRACTITIONER

## 2023-03-30 PROCEDURE — 99396 PREV VISIT EST AGE 40-64: CPT | Performed by: NURSE PRACTITIONER

## 2023-03-30 PROCEDURE — 80061 LIPID PANEL: CPT | Performed by: NURSE PRACTITIONER

## 2023-03-30 PROCEDURE — 36415 COLL VENOUS BLD VENIPUNCTURE: CPT | Performed by: NURSE PRACTITIONER

## 2023-03-30 PROCEDURE — G0103 PSA SCREENING: HCPCS | Performed by: NURSE PRACTITIONER

## 2023-03-30 ASSESSMENT — ENCOUNTER SYMPTOMS
DIARRHEA: 0
JOINT SWELLING: 0
COUGH: 0
SORE THROAT: 0
MYALGIAS: 0
DYSURIA: 0
ABDOMINAL PAIN: 0
SHORTNESS OF BREATH: 0
FREQUENCY: 0
NAUSEA: 0
CONSTIPATION: 0
PARESTHESIAS: 0
WEAKNESS: 0
CHILLS: 0
EYE PAIN: 0
HEARTBURN: 0
ARTHRALGIAS: 0
HEMATOCHEZIA: 0
PALPITATIONS: 0
HEADACHES: 0
NERVOUS/ANXIOUS: 0
FEVER: 0
DIZZINESS: 0
HEMATURIA: 0

## 2023-03-30 NOTE — PROGRESS NOTES
SUBJECTIVE:   CC: Teja is an 53 year old who presents for preventative health visit.   Additional Questions 3/30/2023   Roomed by Zaina ZAYAS   Accompanied by [email or web address]     Patient Reported Additional Medications 3/30/2023   Patient reports taking the following new medications na   Patient has been advised of split billing requirements and indicates understanding: Yes  Healthy Habits:     Getting at least 3 servings of Calcium per day:  NO    Bi-annual eye exam:  Yes    Dental care twice a year:  NO    Sleep apnea or symptoms of sleep apnea:  None    Diet:  Regular (no restrictions)    Frequency of exercise:  2-3 days/week    Duration of exercise:  15-30 minutes    Taking medications regularly:  Yes    Medication side effects:  Not applicable    PHQ-2 Total Score: 0        Today's PHQ-2 Score:   PHQ-2 ( 1999 Pfizer) 3/23/2023   Q1: Little interest or pleasure in doing things 0   Q2: Feeling down, depressed or hopeless 0   PHQ-2 Score 0   Q1: Little interest or pleasure in doing things Not at all   Q2: Feeling down, depressed or hopeless Not at all   PHQ-2 Score 0           Social History     Tobacco Use     Smoking status: Former     Smokeless tobacco: Never   Substance Use Topics     Alcohol use: Yes     Alcohol/week: 1.7 standard drinks         Alcohol Use 3/23/2023   Prescreen: >3 drinks/day or >7 drinks/week? No     Last PSA:   Prostate Specific Antigen Screen   Date Value Ref Range Status   03/10/2022 0.17 0.00 - 3.50 ug/L Final       Reviewed orders with patient. Reviewed health maintenance and updated orders accordingly - Yes  Lab work is in process    Reviewed and updated as needed this visit by clinical staff   Tobacco  Allergies  Meds              Reviewed and updated as needed this visit by Provider                 Past Medical History:   Diagnosis Date     Gilbert's syndrome         Review of Systems   Constitutional: Negative for chills and fever.   HENT: Negative for congestion, ear pain, hearing loss and sore  "throat.    Eyes: Negative for pain and visual disturbance.   Respiratory: Negative for cough and shortness of breath.    Cardiovascular: Negative for chest pain, palpitations and peripheral edema.   Gastrointestinal: Negative for abdominal pain, constipation, diarrhea, heartburn, hematochezia and nausea.   Genitourinary: Negative for dysuria, frequency, genital sores, hematuria, impotence, penile discharge and urgency.   Musculoskeletal: Negative for arthralgias, joint swelling and myalgias.   Skin: Negative for rash.   Neurological: Negative for dizziness, weakness, headaches and paresthesias.   Psychiatric/Behavioral: Negative for mood changes. The patient is not nervous/anxious.      CONSTITUTIONAL: NEGATIVE for fever, chills, change in weight  INTEGUMENTARY/SKIN: NEGATIVE for worrisome rashes, moles or lesions  EYES: NEGATIVE for vision changes or irritation  ENT: NEGATIVE for ear, mouth and throat problems  RESP: NEGATIVE for significant cough or SOB  CV: NEGATIVE for chest pain, palpitations or peripheral edema  GI: NEGATIVE for nausea, abdominal pain, heartburn, or change in bowel habits   male: negative for dysuria, hematuria, decreased urinary stream, erectile dysfunction, urethral discharge  MUSCULOSKELETAL: NEGATIVE for significant arthralgias or myalgia  NEURO: NEGATIVE for weakness, dizziness or paresthesias  PSYCHIATRIC: NEGATIVE for changes in mood or affect    OBJECTIVE:   /88   Pulse 63   Temp 98  F (36.7  C)   Resp 16   Ht 1.791 m (5' 10.5\")   Wt 89.4 kg (197 lb)   SpO2 99%   BMI 27.87 kg/m      Physical Exam  GENERAL: healthy, alert and no distress  NECK: no adenopathy, no asymmetry, masses, or scars and thyroid normal to palpation  RESP: lungs clear to auscultation - no rales, rhonchi or wheezes  CV: regular rate and rhythm, normal S1 S2, no S3 or S4, no murmur, click or rub, no peripheral edema and peripheral pulses strong  ABDOMEN: soft, nontender, no hepatosplenomegaly, no " masses and bowel sounds normal  MS: no gross musculoskeletal defects noted, no edema    Diagnostic Test Results:  Labs reviewed in Epic    ASSESSMENT/PLAN:     1. Annual physical exam  Teja is doing very well.  He recently started a new job as an  at [employer].  No new issues or complaints to review this year    2. Gilbert's Syndrome  We have been monitoring his liver labs and they have been stable  - Comprehensive metabolic panel; Future    3. Basal cell carcinoma (BCC), unspecified site  Follows with dermatology consultants on a yearly basis    4. Family history of prostate cancer in father  PSAs have been at goal.  He did have a temporary dysuria type symptoms that lasted for about 24 hours this past November but UA was normal  - PSA, screen; Future    5. Screening for lipoid disorders  Prior cholesterol labs have looked excellent  - Lipid Profile (Chol, Trig, HDL, LDL calc); Future    6. Melanoma in situ of right lower leg (H)  Diagnosed 4 years ago.  Again, follows with dermatology consultants on a yearly basis    Patient Instructions   Blood pressure and other vital signs look good.    Weight looks good.    Check with insurance on Shingrix shingles vaccine coverage.  You can get this at local pharmacy for reduced cost if not covered.    Colonoscopy due in 2028.    We will check a variety of labs today.  Results come through [employer].    Follow-up in 1 year, sooner if needed        Patient has been advised of split billing requirements and indicates understanding: Yes      COUNSELING:   Reviewed preventive health counseling, as reflected in patient instructions       Regular exercise       Healthy diet/nutrition       Vision screening       Hearing screening        He reports that he has quit smoking. He has never used smokeless tobacco.        Jase Rae, [facility]

## 2023-03-30 NOTE — PATIENT INSTRUCTIONS
Blood pressure and other vital signs look good.    Weight looks good.    Check with insurance on Shingrix shingles vaccine coverage.  You can get this at local pharmacy for reduced cost if not covered.    Colonoscopy due in 2028.    We will check a variety of labs today.  Results come through [employer]    Follow-up in 1 year, sooner if needed

## 2023-09-26 ENCOUNTER — OFFICE VISIT (OUTPATIENT)
Dept: INTERNAL MEDICINE | Facility: CLINIC | Age: 53
End: 2023-09-26
Payer: COMMERCIAL

## 2023-09-26 VITALS
TEMPERATURE: 97.9 F | SYSTOLIC BLOOD PRESSURE: 138 MMHG | OXYGEN SATURATION: 99 % | DIASTOLIC BLOOD PRESSURE: 88 MMHG | RESPIRATION RATE: 18 BRPM | HEART RATE: 54 BPM | HEIGHT: 71 IN | WEIGHT: 195.6 LBS | BODY MASS INDEX: 27.38 KG/M2

## 2023-09-26 DIAGNOSIS — R21 RASH: Primary | ICD-10-CM

## 2023-09-26 PROCEDURE — 99213 OFFICE O/P EST LOW 20 MIN: CPT | Performed by: NURSE PRACTITIONER

## 2023-09-26 RX ORDER — KETOCONAZOLE 20 MG/G
CREAM TOPICAL DAILY
Qty: 15 G | Refills: 0 | Status: SHIPPED | OUTPATIENT
Start: 2023-09-26

## 2023-09-26 NOTE — PROGRESS NOTES
"  Assessment & Plan     Rash  Possible Candida intertrigo.  We will try with topical ketoconazole.  I did tell him to consider using [employer] body powder to help with sweating in the area.  Fortunately, he has follow-up with dermatology in October for his yearly exam.    - ketoconazole (NIZORAL) 2 % external cream; Apply topically daily    Jase Rae, CNP  [facility]    Ester Lezama is a 53 year old, presenting for the following health issues:  Fungal Infection      9/26/2023    10:05 AM   Additional Questions   Roomed by Amol       History of Present Illness       Reason for visit:  Jock itch  Symptom onset:  More than a month  Symptoms include:  Itch slight pain  Symptom intensity:  Mild  Symptom progression:  Staying the same  Had these symptoms before:  No  What makes it better:  Exersize    He eats 2-3 servings of fruits and vegetables daily.He consumes 0 sweetened beverage(s) daily.He exercises with enough effort to increase his heart rate 30 to 60 minutes per day.  He exercises with enough effort to increase his heart rate 5 days per week.   He is taking medications regularly.         Review of Systems   Constitutional, HEENT, cardiovascular, pulmonary, gi and gu systems are negative, except as otherwise noted.      Objective    BP (!) 160/84 (BP Location: Right arm, Patient Position: Sitting, Cuff Size: Adult Regular)   Pulse 54   Temp 97.9  F (36.6  C) (Oral)   Resp 18   Ht 1.791 m (5' 10.51\")   Wt 88.7 kg (195 lb 9.6 oz)   SpO2 99%   BMI 27.66 kg/m    Body mass index is 27.66 kg/m .  Physical Exam     General exam mostly normal with no obvious rash appreciated        "

## 2023-10-10 ENCOUNTER — TRANSFERRED RECORDS (OUTPATIENT)
Dept: HEALTH INFORMATION MANAGEMENT | Facility: CLINIC | Age: 53
End: 2023-10-10
Payer: COMMERCIAL

## 2024-04-15 SDOH — HEALTH STABILITY: PHYSICAL HEALTH: ON AVERAGE, HOW MANY DAYS PER WEEK DO YOU ENGAGE IN MODERATE TO STRENUOUS EXERCISE (LIKE A BRISK WALK)?: 5 DAYS

## 2024-04-15 SDOH — HEALTH STABILITY: PHYSICAL HEALTH: ON AVERAGE, HOW MANY MINUTES DO YOU ENGAGE IN EXERCISE AT THIS LEVEL?: 30 MIN

## 2024-04-15 ASSESSMENT — SOCIAL DETERMINANTS OF HEALTH (SDOH): HOW OFTEN DO YOU GET TOGETHER WITH FRIENDS OR RELATIVES?: ONCE A WEEK

## 2024-04-16 ENCOUNTER — OFFICE VISIT (OUTPATIENT)
Dept: INTERNAL MEDICINE | Facility: CLINIC | Age: 54
End: 2024-04-16
Payer: COMMERCIAL

## 2024-04-16 VITALS
RESPIRATION RATE: 18 BRPM | TEMPERATURE: 96.9 F | HEIGHT: 70 IN | OXYGEN SATURATION: 98 % | WEIGHT: 198 LBS | SYSTOLIC BLOOD PRESSURE: 142 MMHG | HEART RATE: 67 BPM | DIASTOLIC BLOOD PRESSURE: 90 MMHG | BODY MASS INDEX: 28.35 KG/M2

## 2024-04-16 DIAGNOSIS — C44.91 BASAL CELL CARCINOMA (BCC), UNSPECIFIED SITE: ICD-10-CM

## 2024-04-16 DIAGNOSIS — R03.0 ELEVATED BLOOD PRESSURE READING WITHOUT DIAGNOSIS OF HYPERTENSION: ICD-10-CM

## 2024-04-16 DIAGNOSIS — E80.6 DISORDER OF BILIRUBIN EXCRETION: Primary | ICD-10-CM

## 2024-04-16 DIAGNOSIS — Z13.220 SCREENING FOR LIPOID DISORDERS: ICD-10-CM

## 2024-04-16 DIAGNOSIS — D03.71 MELANOMA IN SITU OF RIGHT LOWER LEG (H): ICD-10-CM

## 2024-04-16 DIAGNOSIS — Z86.39 HISTORY OF VITAMIN D DEFICIENCY: ICD-10-CM

## 2024-04-16 DIAGNOSIS — Z80.42 FAMILY HISTORY OF PROSTATE CANCER IN FATHER: ICD-10-CM

## 2024-04-16 DIAGNOSIS — Z00.00 ANNUAL PHYSICAL EXAM: ICD-10-CM

## 2024-04-16 PROCEDURE — 99396 PREV VISIT EST AGE 40-64: CPT | Performed by: NURSE PRACTITIONER

## 2024-04-16 PROCEDURE — G0103 PSA SCREENING: HCPCS | Performed by: NURSE PRACTITIONER

## 2024-04-16 PROCEDURE — 82306 VITAMIN D 25 HYDROXY: CPT | Performed by: NURSE PRACTITIONER

## 2024-04-16 PROCEDURE — 99213 OFFICE O/P EST LOW 20 MIN: CPT | Mod: 25 | Performed by: NURSE PRACTITIONER

## 2024-04-16 PROCEDURE — 36415 COLL VENOUS BLD VENIPUNCTURE: CPT | Performed by: NURSE PRACTITIONER

## 2024-04-16 PROCEDURE — 80061 LIPID PANEL: CPT | Performed by: NURSE PRACTITIONER

## 2024-04-16 PROCEDURE — 80053 COMPREHEN METABOLIC PANEL: CPT | Performed by: NURSE PRACTITIONER

## 2024-04-16 NOTE — PATIENT INSTRUCTIONS
Your blood pressure is borderline elevated today.  I need you to purchase a blood pressure cuff and start checking your blood pressures on a daily basis.    Please send me a [employer] message in a couple of weeks with some BP readings.  Goal blood pressure consistently less than 140/90.    Check with your parents to see which blood pressure medications they use.    We will check a variety of labs today.  Results to come through [employer].    Colonoscopy due in 2028.

## 2024-04-16 NOTE — PROGRESS NOTES
"Preventive Care Visit  [facility]  Jase Rae, CNP, Nurse Practitioner - Family  Apr 16, 2024      Assessment & Plan     Annual physical exam  Teja is doing well.  He continues working as an  for [employer].  He is  with 3 adult children, the youngest of which is a freshman in college    Gilbert's Syndrome  We have been keeping tabs on his liver enzymes on a yearly basis  - Comprehensive metabolic panel; Future    Elevated blood pressure reading without diagnosis of hypertension  Blood pressure 142/90 today.  There is a strong family history of hypertension in both his parents.  He is going to start checking his blood pressure on a consistent basis at home and send us a [employer] message in a couple of weeks with home readings.    Consider starting amlodipine 5 mg daily, if needed    Basal cell carcinoma (BCC), unspecified site  He sees dermatology on an annual basis    Melanoma in situ of right lower leg (H)  Sees dermatology on an annual basis    Family history of prostate cancer in father  - PSA, screen; Future    History of vitamin D deficiency  - Vitamin D Deficiency; Future    Screening for lipoid disorders  - Lipid Profile (Chol, Trig, HDL, LDL calc); Future    Patient Instructions   Your blood pressure is borderline elevated today.  I need you to purchase a blood pressure cuff and start checking your blood pressures on a daily basis.    Please send me a [employer] message in a couple of weeks with some BP readings.  Goal blood pressure consistently less than 140/90.    Check with your parents to see which blood pressure medications they use.    We will check a variety of labs today.  Results to come through [employer].    Colonoscopy due in 2028.                BMI  Estimated body mass index is 28.25 kg/m  as calculated from the following:    Height as of this encounter: 1.783 m (5' 10.2\").    Weight as of this encounter: 89.8 kg (198 lb). "       Counseling  Appropriate preventive services were discussed with this patient, including applicable screening as appropriate for fall prevention, nutrition, physical activity, Tobacco-use cessation, weight loss and cognition.  Checklist reviewing preventive services available has been given to the patient.  Reviewed patient's diet, addressing concerns and/or questions.   The patient was instructed to see the dentist every 6 months.           Ester Lezama is a 54 year old, presenting for the following:  Physical (Fasting.)        4/16/2024     6:51 AM   Additional Questions   Roomed by Zaina ZAYAS   Accompanied by [email or web address]         4/16/2024   Forms   Any forms needing to be completed Yes        Health Care Directive  Patient does not have a Health Care Directive or Living Will: Discussed advance care planning with patient; information given to patient to review.    HPI  Here for annual physical exam.  Feeling well and has no acute complaints            4/15/2024   General Health   How would you rate your overall physical health? Good   Feel stress (tense, anxious, or unable to sleep) To some extent   (!) STRESS CONCERN      4/15/2024   Nutrition   Three or more servings of calcium each day? (!) NO   Diet: Low salt    Low fat/cholesterol   How many servings of fruit and vegetables per day? (!) 2-3   How many sweetened beverages each day? 0-1         4/15/2024   Exercise   Days per week of moderate/strenous exercise 5 days   Average minutes spent exercising at this level 30 min         4/15/2024   Social Factors   Frequency of gathering with friends or relatives Once a week   Worry food won't last until get money to buy more No   Food not last or not have enough money for food? No   Do you have housing?  Yes   Are you worried about losing your housing? No   Lack of transportation? No   Unable to get utilities (heat,electricity)? No         4/15/2024   Fall Risk   Fallen 2 or more times in the past year? No   Trouble  "with walking or balance? No          4/15/2024   Dental   Dentist two times every year? (!) NO         4/15/2024   TB Screening   Were you born outside of the [place]? No         Today's PHQ-2 Score:       4/15/2024     1:03 PM   PHQ-2 ( 1999 [employer])   Q1: Little interest or pleasure in doing things 0   Q2: Feeling down, depressed or hopeless 1   PHQ-2 Score 1   Q1: Little interest or pleasure in doing things Not at all   Q2: Feeling down, depressed or hopeless Several days   PHQ-2 Score 1           4/15/2024   Substance Use   Alcohol more than 3/day or more than 7/wk No   Do you use any other substances recreationally? (!) ALCOHOL     Social History     Tobacco Use    Smoking status: Former     Passive exposure: Past    Smokeless tobacco: Never   Vaping Use    Vaping status: Never Used   Substance Use Topics    Alcohol use: Yes     Alcohol/week: 1.7 standard drinks of alcohol    Drug use: No           4/15/2024   STI Screening   New sexual partner(s) since last STI/HIV test? No   Last PSA:   Prostate Specific Antigen Screen   Date Value Ref Range Status   03/30/2023 0.20 0.00 - 3.50 ng/mL Final   03/10/2022 0.17 0.00 - 3.50 ug/L Final     ASCVD Risk   The 10-year ASCVD risk score (Dutch DIETZ, et al., 2019) is: 6.6%    Values used to calculate the score:      Age: 54 years      Sex: Male      Is Non- : No      Diabetic: No      Tobacco smoker: No      Systolic Blood Pressure: 138 mmHg      Is BP treated: No      HDL Cholesterol: 38 mg/dL      Total Cholesterol: 155 mg/dL           Reviewed and updated as needed this visit by Provider                             Objective    Exam  There were no vitals taken for this visit.   Estimated body mass index is 27.66 kg/m  as calculated from the following:    Height as of 9/26/23: 1.791 m (5' 10.51\").    Weight as of 9/26/23: 88.7 kg (195 lb 9.6 oz).    Physical Exam  GENERAL: alert and no distress  NECK: no adenopathy, no asymmetry, masses, or " scars  RESP: lungs clear to auscultation - no rales, rhonchi or wheezes  CV: regular rate and rhythm, normal S1 S2, no S3 or S4, no murmur, click or rub, no peripheral edema  ABDOMEN: soft, nontender, no hepatosplenomegaly, no masses and bowel sounds normal  MS: no gross musculoskeletal defects noted, no edema        Signed Electronically by: Jase Rae, CNP

## 2024-04-17 LAB
ALBUMIN SERPL BCG-MCNC: 4.5 G/DL (ref 3.5–5.2)
ALP SERPL-CCNC: 70 U/L (ref 40–150)
ALT SERPL W P-5'-P-CCNC: 59 U/L (ref 0–70)
ANION GAP SERPL CALCULATED.3IONS-SCNC: 11 MMOL/L (ref 7–15)
AST SERPL W P-5'-P-CCNC: 40 U/L (ref 0–45)
BILIRUB SERPL-MCNC: 1.7 MG/DL
BUN SERPL-MCNC: 28.9 MG/DL (ref 6–20)
CALCIUM SERPL-MCNC: 9 MG/DL (ref 8.6–10)
CHLORIDE SERPL-SCNC: 109 MMOL/L (ref 98–107)
CHOLEST SERPL-MCNC: 144 MG/DL
CREAT SERPL-MCNC: 1.17 MG/DL (ref 0.67–1.17)
DEPRECATED HCO3 PLAS-SCNC: 22 MMOL/L (ref 22–29)
EGFRCR SERPLBLD CKD-EPI 2021: 73 ML/MIN/1.73M2
FASTING STATUS PATIENT QL REPORTED: YES
GLUCOSE SERPL-MCNC: 86 MG/DL (ref 70–99)
HDLC SERPL-MCNC: 39 MG/DL
LDLC SERPL CALC-MCNC: 92 MG/DL
NONHDLC SERPL-MCNC: 105 MG/DL
POTASSIUM SERPL-SCNC: 3.8 MMOL/L (ref 3.4–5.3)
PROT SERPL-MCNC: 7 G/DL (ref 6.4–8.3)
PSA SERPL DL<=0.01 NG/ML-MCNC: 0.3 NG/ML (ref 0–3.5)
SODIUM SERPL-SCNC: 142 MMOL/L (ref 135–145)
TRIGL SERPL-MCNC: 65 MG/DL
VIT D+METAB SERPL-MCNC: 26 NG/ML (ref 20–50)

## 2024-10-11 ENCOUNTER — TRANSFERRED RECORDS (OUTPATIENT)
Dept: HEALTH INFORMATION MANAGEMENT | Facility: CLINIC | Age: 54
End: 2024-10-11
Payer: COMMERCIAL

## 2025-04-16 ENCOUNTER — OFFICE VISIT (OUTPATIENT)
Dept: INTERNAL MEDICINE | Facility: CLINIC | Age: 55
End: 2025-04-16
Payer: COMMERCIAL

## 2025-04-16 VITALS
WEIGHT: 195 LBS | DIASTOLIC BLOOD PRESSURE: 80 MMHG | SYSTOLIC BLOOD PRESSURE: 134 MMHG | TEMPERATURE: 98 F | OXYGEN SATURATION: 98 % | HEIGHT: 70 IN | RESPIRATION RATE: 14 BRPM | HEART RATE: 67 BPM | BODY MASS INDEX: 27.92 KG/M2

## 2025-04-16 DIAGNOSIS — Z13.220 SCREENING FOR LIPOID DISORDERS: ICD-10-CM

## 2025-04-16 DIAGNOSIS — C44.91 BASAL CELL CARCINOMA (BCC), UNSPECIFIED SITE: ICD-10-CM

## 2025-04-16 DIAGNOSIS — Z12.5 SCREENING FOR PROSTATE CANCER: ICD-10-CM

## 2025-04-16 DIAGNOSIS — Z13.1 SCREENING FOR DIABETES MELLITUS: Primary | ICD-10-CM

## 2025-04-16 DIAGNOSIS — E80.4 GILBERT'S DISEASE: ICD-10-CM

## 2025-04-16 DIAGNOSIS — E80.6 DISORDER OF BILIRUBIN EXCRETION: ICD-10-CM

## 2025-04-16 DIAGNOSIS — Z00.00 ANNUAL PHYSICAL EXAM: ICD-10-CM

## 2025-04-16 DIAGNOSIS — F41.9 ANXIETY: ICD-10-CM

## 2025-04-16 PROBLEM — D03.71 MELANOMA IN SITU OF RIGHT LOWER LEG (H): Status: RESOLVED | Noted: 2023-03-30 | Resolved: 2025-04-16

## 2025-04-16 LAB
ALBUMIN SERPL BCG-MCNC: 4.7 G/DL (ref 3.5–5.2)
ALP SERPL-CCNC: 78 U/L (ref 40–150)
ALT SERPL W P-5'-P-CCNC: 56 U/L (ref 0–70)
ANION GAP SERPL CALCULATED.3IONS-SCNC: 12 MMOL/L (ref 7–15)
AST SERPL W P-5'-P-CCNC: 45 U/L (ref 0–45)
BILIRUB SERPL-MCNC: 1.5 MG/DL
BUN SERPL-MCNC: 29.9 MG/DL (ref 6–20)
CALCIUM SERPL-MCNC: 9.5 MG/DL (ref 8.8–10.4)
CHLORIDE SERPL-SCNC: 109 MMOL/L (ref 98–107)
CHOLEST SERPL-MCNC: 165 MG/DL
CREAT SERPL-MCNC: 1.18 MG/DL (ref 0.67–1.17)
EGFRCR SERPLBLD CKD-EPI 2021: 72 ML/MIN/1.73M2
EST. AVERAGE GLUCOSE BLD GHB EST-MCNC: 103 MG/DL
FASTING STATUS PATIENT QL REPORTED: YES
FASTING STATUS PATIENT QL REPORTED: YES
GLUCOSE SERPL-MCNC: 94 MG/DL (ref 70–99)
HBA1C MFR BLD: 5.2 % (ref 0–5.6)
HCO3 SERPL-SCNC: 22 MMOL/L (ref 22–29)
HDLC SERPL-MCNC: 43 MG/DL
LDLC SERPL CALC-MCNC: 110 MG/DL
NONHDLC SERPL-MCNC: 122 MG/DL
POTASSIUM SERPL-SCNC: 3.9 MMOL/L (ref 3.4–5.3)
PROT SERPL-MCNC: 7.5 G/DL (ref 6.4–8.3)
PSA SERPL DL<=0.01 NG/ML-MCNC: 0.23 NG/ML (ref 0–3.5)
SODIUM SERPL-SCNC: 143 MMOL/L (ref 135–145)
TRIGL SERPL-MCNC: 61 MG/DL

## 2025-04-16 PROCEDURE — 80061 LIPID PANEL: CPT | Performed by: NURSE PRACTITIONER

## 2025-04-16 PROCEDURE — 3079F DIAST BP 80-89 MM HG: CPT | Performed by: NURSE PRACTITIONER

## 2025-04-16 PROCEDURE — 99396 PREV VISIT EST AGE 40-64: CPT | Performed by: NURSE PRACTITIONER

## 2025-04-16 PROCEDURE — 83036 HEMOGLOBIN GLYCOSYLATED A1C: CPT | Performed by: NURSE PRACTITIONER

## 2025-04-16 PROCEDURE — 3075F SYST BP GE 130 - 139MM HG: CPT | Performed by: NURSE PRACTITIONER

## 2025-04-16 PROCEDURE — G0103 PSA SCREENING: HCPCS | Performed by: NURSE PRACTITIONER

## 2025-04-16 PROCEDURE — 80053 COMPREHEN METABOLIC PANEL: CPT | Performed by: NURSE PRACTITIONER

## 2025-04-16 PROCEDURE — 36415 COLL VENOUS BLD VENIPUNCTURE: CPT | Performed by: NURSE PRACTITIONER

## 2025-04-16 SDOH — HEALTH STABILITY: PHYSICAL HEALTH: ON AVERAGE, HOW MANY DAYS PER WEEK DO YOU ENGAGE IN MODERATE TO STRENUOUS EXERCISE (LIKE A BRISK WALK)?: 5 DAYS

## 2025-04-16 SDOH — HEALTH STABILITY: PHYSICAL HEALTH: ON AVERAGE, HOW MANY MINUTES DO YOU ENGAGE IN EXERCISE AT THIS LEVEL?: 30 MIN

## 2025-04-16 ASSESSMENT — SOCIAL DETERMINANTS OF HEALTH (SDOH): HOW OFTEN DO YOU GET TOGETHER WITH FRIENDS OR RELATIVES?: ONCE A WEEK

## 2025-04-16 NOTE — PROGRESS NOTES
Preventive Care Visit  [facility]  Jase Rae, CNP, Nurse Practitioner - Family  Apr 16, 2025      Assessment & Plan     Annual physical exam  Teja seems to be doing well.  He works as an  for [employer].  He is  with 3 adult children.  His daughter is living with him now, she has a miniature [place].    They are in the process of moving into a new home and things have been a bit stressful lately but he appears to be managing okay    Screening for diabetes mellitus  - Hemoglobin A1c; Future    Screening for lipoid disorders  - Lipid Profile (Chol, Trig, HDL, LDL calc); Future    Gilbert's Syndrome  - Comprehensive metabolic panel; Future    Screening for prostate cancer  - PSA, screen; Future    Anxiety  He manages this on his own without medication, appears stable    Gilbert's disease  We have been checking his CMP every year    Basal cell carcinoma (BCC), unspecified site  Follows regularly with dermatology    Family history essential hypertension  Both of his parents started blood pressure medications in the 30s.  He has had periodic borderline blood pressure elevations, 142/90 last year.  He does have a reliable BP cuff and has been checking periodically with most BP regimen send in goal range.  Blood pressure 134/80 today.    Patient Instructions   Lab work today.    Results will come through [employer].    Consider headspace guided meditation at [facility].    Consider pneumococcal pneumonia vaccine.    Consider Shingrix shingles vaccine.    Blood pressure looks good today.  Consider spot checking your blood pressure periodically a couple of times per month.  Goal blood pressure consistently less than 140/90.    Follow-up in 1 year, sooner if needed      The longitudinal plan of care for the diagnosis(es)/condition(s) as documented were addressed during this visit. Due to the added complexity in care, I will continue to support Teja in the  "subsequent management and with ongoing continuity of care.              BMI  Estimated body mass index is 27.82 kg/m  as calculated from the following:    Height as of this encounter: 1.783 m (5' 10.2\").    Weight as of this encounter: 88.5 kg (195 lb).       Counseling  Appropriate preventive services were addressed with this patient via screening, questionnaire, or discussion as appropriate for fall prevention, nutrition, physical activity, Tobacco-use cessation, social engagement, weight loss and cognition.  Checklist reviewing preventive services available has been given to the patient.  Reviewed patient's diet, addressing concerns and/or questions.   The patient was instructed to see the dentist every 6 months.   He is at risk for psychosocial distress and has been provided with information to reduce risk.           Ester Lezama is a 55 year old, presenting for the following:  Physical (FASTING)        4/16/2025     6:56 AM   Additional Questions   Roomed by           HPI  Here for Annual physical         Advance Care Planning    Discussed advance care planning with patient; informed AVS has link to [facility].        4/16/2025   General Health   How would you rate your overall physical health? Good   Feel stress (tense, anxious, or unable to sleep) To some extent   (!) STRESS CONCERN      4/16/2025   Nutrition   Three or more servings of calcium each day? (!) NO   Diet: Low salt   How many servings of fruit and vegetables per day? (!) 2-3   How many sweetened beverages each day? 0-1         4/16/2025   Exercise   Days per week of moderate/strenous exercise 5 days   Average minutes spent exercising at this level 30 min         4/16/2025   Social Factors   Frequency of gathering with friends or relatives Once a week   Worry food won't last until get money to buy more No   Food not last or not have enough money for food? No   Do you have housing? (Housing is defined as stable permanent housing and " does not include staying ouside in a car, in a tent, in an abandoned building, in an overnight shelter, or couch-surfing.) Yes   Are you worried about losing your housing? No   Lack of transportation? No   Unable to get utilities (heat,electricity)? No         4/16/2025   Fall Risk   Fallen 2 or more times in the past year? No   Trouble with walking or balance? No          4/16/2025   Dental   Dentist two times every year? (!) NO         Today's PHQ-2 Score:       4/16/2025     6:52 AM   PHQ-2 ( 1999 [employer])   Q1: Little interest or pleasure in doing things 0   Q2: Feeling down, depressed or hopeless 0   PHQ-2 Score 0    Q1: Little interest or pleasure in doing things Not at all   Q2: Feeling down, depressed or hopeless Not at all   PHQ-2 Score 0       Patient-reported           4/16/2025   Substance Use   Alcohol more than 3/day or more than 7/wk No   Do you use any other substances recreationally? No     Social History     Tobacco Use    Smoking status: Former     Passive exposure: Past    Smokeless tobacco: Never   Vaping Use    Vaping status: Never Used   Substance Use Topics    Alcohol use: Yes     Alcohol/week: 1.7 standard drinks of alcohol    Drug use: No           4/16/2025   STI Screening   New sexual partner(s) since last STI/HIV test? No   Last PSA:   Prostate Specific Antigen Screen   Date Value Ref Range Status   04/16/2024 0.30 0.00 - 3.50 ng/mL Final   03/10/2022 0.17 0.00 - 3.50 ug/L Final     ASCVD Risk   The 10-year ASCVD risk score (Dutch DIETZ, et al., 2019) is: 6.3%    Values used to calculate the score:      Age: 55 years      Sex: Male      Is Non- : No      Diabetic: No      Tobacco smoker: No      Systolic Blood Pressure: 134 mmHg      Is BP treated: No      HDL Cholesterol: 39 mg/dL      Total Cholesterol: 144 mg/dL           Reviewed and updated as needed this visit by Provider                             Objective    Exam  /80 (BP Location: Right arm,  "Patient Position: Sitting, Cuff Size: Adult Regular)   Pulse 67   Temp 98  F (36.7  C) (Oral)   Resp 14   Ht 1.783 m (5' 10.2\")   Wt 88.5 kg (195 lb)   SpO2 98%   BMI 27.82 kg/m     Estimated body mass index is 27.82 kg/m  as calculated from the following:    Height as of this encounter: 1.783 m (5' 10.2\").    Weight as of this encounter: 88.5 kg (195 lb).    Physical Exam  GENERAL: alert and no distress  NECK: no adenopathy, no asymmetry, masses, or scars  RESP: lungs clear to auscultation - no rales, rhonchi or wheezes  CV: regular rate and rhythm, normal S1 S2, no S3 or S4, no murmur, click or rub, no peripheral edema  ABDOMEN: soft, nontender, no hepatosplenomegaly, no masses and bowel sounds normal  MS: no gross musculoskeletal defects noted, no edema        Signed Electronically by: Jase Rae CNP    "

## 2025-04-16 NOTE — PATIENT INSTRUCTIONS
Lab work today.    Results will come through [employer].    Consider headspace guided meditation at [employer].    Consider pneumococcal pneumonia vaccine.    Consider Shingrix shingles vaccine.    Blood pressure looks good today.  Consider spot checking your blood pressure periodically a couple of times per month.  Goal blood pressure consistently less than 140/90.    Follow-up in 1 year, sooner if needed